# Patient Record
Sex: FEMALE | Race: WHITE
[De-identification: names, ages, dates, MRNs, and addresses within clinical notes are randomized per-mention and may not be internally consistent; named-entity substitution may affect disease eponyms.]

---

## 2017-04-17 ENCOUNTER — HOSPITAL ENCOUNTER (EMERGENCY)
Dept: HOSPITAL 10 - FTE | Age: 72
Discharge: HOME | End: 2017-04-17
Payer: MEDICARE

## 2017-04-17 VITALS
BODY MASS INDEX: 34.08 KG/M2 | WEIGHT: 185.19 LBS | BODY MASS INDEX: 34.08 KG/M2 | HEIGHT: 62 IN | HEIGHT: 62 IN | WEIGHT: 185.19 LBS

## 2017-04-17 VITALS
DIASTOLIC BLOOD PRESSURE: 77 MMHG | SYSTOLIC BLOOD PRESSURE: 156 MMHG | HEART RATE: 68 BPM | RESPIRATION RATE: 18 BRPM | TEMPERATURE: 98.2 F

## 2017-04-17 DIAGNOSIS — Z04.3: ICD-10-CM

## 2017-04-17 DIAGNOSIS — M25.561: Primary | ICD-10-CM

## 2017-04-17 DIAGNOSIS — I10: ICD-10-CM

## 2017-04-17 DIAGNOSIS — Z79.82: ICD-10-CM

## 2017-04-17 PROCEDURE — 96372 THER/PROPH/DIAG INJ SC/IM: CPT

## 2017-04-17 PROCEDURE — 29505 APPLICATION LONG LEG SPLINT: CPT

## 2017-04-17 PROCEDURE — 73562 X-RAY EXAM OF KNEE 3: CPT

## 2017-04-17 NOTE — RADRPT
PROCEDURE:   Right knee x-ray 

 

CLINICAL INDICATION:   Right knee pain.

 

TECHNIQUE:   AP, lateral and oblique views of the knee were obtained. 

 

COMPARISON:   None 

 

FINDINGS:

There are degenerative spurs off of the lateral femoral and tibial condyles and off of the articular
 surface of the right patella.  No effusion is present.  There is an enthesiophyte at the insertion 
site of the quadriceps tendon on the patella.

 

IMPRESSION:

 

1.  Osteoarthritis of the right knee without evidence of a joint space effusion.

2.  Enthesopathy of the quadriceps tendon.

 

 

RPTAT:AAJJ

_____________________________________________ 

Physician Josh           Date    Time 

Electronically viewed and signed by Physician Josh on 04/17/2017 16:39 

 

D:  04/17/2017 16:39  T:  04/17/2017 16:39

/

## 2017-04-17 NOTE — ERD
ER Documentation


Chief Complaint


Date/Time


DATE: 4/17/17 


TIME: 16:55


Chief Complaint


RT THIGH PAIN SINCE FRIDAY, SUDDEN ONSET, DENIES TRAUMA





HPI


This is a 71-year-old female that presents to the ER with right knee pain that 

occurred on Friday while she was doing Aure.  Patient states that she twisted 

her knee and heard a pop.  Patient has been trying to take ibuprofen for the 

pain however has not been working.  Patient denies any numbness or tingling of 

her leg.  She denies any leg swelling or redness.  Patient denies any chest 

pain or shortness of breath.





ROS


12 point review of systems was done, all negative except per HPI.





Medications


Home Meds


Active Scripts


Naproxen* (Naprosyn*) 500 Mg Tablet, 500 MG PO BID Y for PAIN AND/OR 

INFLAMMATION, #30 TAB


   Prov:MICAH TILLMAN         4/17/17


Tramadol HCl (Tramadol HCl) 50 Mg Tablet, 50 MG PO Q4 Y for PAIN, #20 TAB


   Prov:MICAH TILLMAN         4/17/17


Albuterol Sulfate* (Proair HFA*) 8.5 Gm Hfa.aer.ad, 2 PUFF INH Q4, #1 INHALER


   Prov:KATIE DELUCA PA-C         3/16/16


Prednisone* (Prednisone*) 50 Mg Tablet, 50 MG PO DAILY, #5 TAB


   Prov:KATIE DELUCA PA-C         3/16/16


Azithromycin* (Zithromax*) 250 Mg Tablet, 250 MG PO .ZPACK AS DIRECTED, #6 TAB


   TAKE 500 MG (2 TABS) THE FIRST DAY THEN 250 MG (1 TAB) DAYS 2-5


   Prov:KATIE DELUCA PA-C         3/16/16


Tramadol HCl (Tramadol HCl) 50 Mg Tablet, 50 MG PO Q4 Y for PAIN, #16 TAB


   Prov:GALLO RIVAS MD         2/4/16


Neomycin-Bacitrac Zinc-Polymyxin (Triple Antibiotic Ointment*) 28.35 Gm 

Oint..gm., 1 APPLIC TOP TID for 10 Days, EA


   Prov:GALLO RIVAS MD         2/4/16


Polyethylene Glycol* (Miralax*) 17 Gm Powd.pack, 17 GM PO DAILY, #7


   Prov:MAUREEN TAYLOR MD         12/21/15


Metronidazole* (Flagyl*) 500 Mg Tablet, 500 MG PO TID for 10 Days, TAB


   Prov:MAUREEN TAYLOR MD         12/21/15


Ciprofloxacin Hcl* (Ciprofloxacin Hcl*) 500 Mg Tablet, 500 MG PO BID for 10 Days

, TAB


   Prov:MAUREEN TAYLOR MD         12/21/15


Albuterol Sulfate* (Albuterol Sulfate* HFA) 8.5 Gm Hfa.aer.ad, 2 PUFF IH Q6, #1 

EA


   Prov:GALLO DOW PA-C         11/15/15


Reported Medications


Aspirin* (Aspirin* EC) 81 Mg Tablet.dr, 81 MG PO DAILY, TAB


   12/21/15


Amlodipine Besylate* (Amlodipine Besylate*) 10 Mg Tablet, 10 MG PO DAILY, #30 

TAB


   12/21/15


Atorvastatin Calcium (Atorvastatin Calcium) 10 Mg Tab, 10 MG PO HS, TAB


   10/29/14


Losartan-Hydrochlorothiazide (Losartan-HCTZ) 100-25 Mg Tab, 1 TAB PO AM, TAB


   10/29/14





Allergies


Allergies:  


Coded Allergies:  


     No Known Allergy (Unverified , 12/21/15)





PMhx/Soc


History of Surgery:  Yes ("right breast" )


Anesthesia Reaction:  No


Hx Neurological Disorder:  No


Hx Respiratory Disorders:  No


Hx Cardiac Disorders:  Yes (htn, hld)


Hx Psychiatric Problems:  No


Hx Miscellaneous Medical Probl:  No


Hx Alcohol Use:  No


Hx Substance Use:  No


Hx Tobacco Use:  No





Physical Exam


Vitals





Vital Signs








  Date Time  Temp Pulse Resp B/P Pulse Ox O2 Delivery O2 Flow Rate FiO2


 


4/17/17 13:35 97.9 62 18 176/79 97   








Physical Exam


GENERAL: The patient is well developed and appropriate for usual state of health

, in no apparent distress.


HEENT: Atraumatic.  


CHEST: Clear to auscultation bilaterally. There are no rales, wheezes or 

rhonchi. 


HEART: Regular rate and rhythm. No murmurs, clicks, rubs or gallops.


EXTREMITIES: Right knee: Patient has painful extension and flexion of the right 

knee there is no tenderness along the joint line.  Negative anterior drawer 

negative posterior drawer negative Lockman test.  Neurovascularly intact.  

Patient does not have any femur pain upon palpation.  She does not have any 

ankle pain.  She has normal range of motion of her ankle.


NEURO: Alert and oriented. 


SKIN: There is no apparent rash or petechia. The skin is warm and dry.


Results 24 hrs





 Current Medications








 Medications


  (Trade)  Dose


 Ordered  Sig/Diego


 Route


 PRN Reason  Start Time


 Stop Time Status Last Admin


Dose Admin


 


 Ketorolac


 Tromethamine


  (Toradol)  30 mg  ONCE  STAT


 IM


   4/17/17 15:39


 4/17/17 15:40 DC 4/17/17 16:15


 


 


 Acetaminophen/


 Hydrocodone Bitart


  (Norco (5/325))  1 tab  ONCE  ONCE


 PO


   4/17/17 16:00


 4/17/17 16:01 DC 4/17/17 16:15


 











Procedures/MDM


This is a 71-year-old female that presents to the ER with right knee pain.  

This occurred when she was doing Aure.  Patient may have a strain of her knee.

  At this time there is evidence of fracture dislocation.  She is 

neurovascularly intact.  Patient will be sent home with naproxen and with 

tramadol.  She was given a knee immobilizer.  She is neurovascularly intact 

before and after splint application.  Patient is to follow-up with her primary 

care doctor within 1-2 days or return to ER sooner if symptoms worsen.  My 

medical decision making shared with the patient she understands and agrees with 

plan.





Departure


Diagnosis:  


 Primary Impression:  


 Knee pain


Condition:  Stable


Patient Instructions:  Knee Pain, Uncertain Cause





Additional Instructions:  


Call your primary care doctor TOMORROW for an appointment during the next 1-2 

days.See the doctor sooner or return here if your condition worsens before your 

appointment time.











MICAH TILLMAN Apr 17, 2017 16:58

## 2018-10-29 ENCOUNTER — HOSPITAL ENCOUNTER (EMERGENCY)
Dept: HOSPITAL 91 - E/R | Age: 73
Discharge: HOME | End: 2018-10-29
Payer: MEDICARE

## 2018-10-29 ENCOUNTER — HOSPITAL ENCOUNTER (EMERGENCY)
Age: 73
Discharge: HOME | End: 2018-10-29

## 2018-10-29 DIAGNOSIS — R10.32: Primary | ICD-10-CM

## 2018-10-29 DIAGNOSIS — I10: ICD-10-CM

## 2018-10-29 DIAGNOSIS — Z79.82: ICD-10-CM

## 2018-10-29 LAB
ADD MAN DIFF?: NO
ADD UMIC: NO
ALANINE AMINOTRANSFERASE: 25 IU/L (ref 13–69)
ALBUMIN/GLOBULIN RATIO: 1.02
ALBUMIN: 3.5 G/DL (ref 3.3–4.9)
ALKALINE PHOSPHATASE: 131 IU/L (ref 42–121)
ANION GAP: 9 (ref 5–13)
ASPARTATE AMINO TRANSFERASE: 31 IU/L (ref 15–46)
BASOPHIL #: 0 10^3/UL (ref 0–0.1)
BASOPHILS %: 0.5 % (ref 0–2)
BILIRUBIN,DIRECT: 0 MG/DL (ref 0–0.2)
BILIRUBIN,TOTAL: 0.2 MG/DL (ref 0.2–1.3)
BLOOD UREA NITROGEN: 17 MG/DL (ref 7–20)
CALCIUM: 9.2 MG/DL (ref 8.4–10.2)
CARBON DIOXIDE: 28 MMOL/L (ref 21–31)
CHLORIDE: 104 MMOL/L (ref 97–110)
CREATININE: 0.79 MG/DL (ref 0.44–1)
EOSINOPHILS #: 0.1 10^3/UL (ref 0–0.5)
EOSINOPHILS %: 1.5 % (ref 0–7)
GLOBULIN: 3.4 G/DL (ref 1.3–3.2)
GLUCOSE: 104 MG/DL (ref 70–220)
HEMATOCRIT: 39.1 % (ref 37–47)
HEMOGLOBIN: 12.9 G/DL (ref 12–16)
IMMATURE GRANS #M: 0.02 10^3/UL (ref 0–0.03)
IMMATURE GRANS % (M): 0.3 % (ref 0–0.43)
LIPASE: 77 U/L (ref 23–300)
LYMPHOCYTES #: 1.5 10^3/UL (ref 0.8–2.9)
LYMPHOCYTES %: 23.4 % (ref 15–51)
MEAN CORPUSCULAR HEMOGLOBIN: 30.1 PG (ref 29–33)
MEAN CORPUSCULAR HGB CONC: 33 G/DL (ref 32–37)
MEAN CORPUSCULAR VOLUME: 91.4 FL (ref 82–101)
MEAN PLATELET VOLUME: 11.3 FL (ref 7.4–10.4)
MONOCYTE #: 0.5 10^3/UL (ref 0.3–0.9)
MONOCYTES %: 7.5 % (ref 0–11)
NEUTROPHIL #: 4.4 10^3/UL (ref 1.6–7.5)
NEUTROPHILS %: 66.8 % (ref 39–77)
NUCLEATED RED BLOOD CELLS #: 0 10^3/UL (ref 0–0)
NUCLEATED RED BLOOD CELLS%: 0 /100WBC (ref 0–0)
PLATELET COUNT: 193 10^3/UL (ref 140–415)
POTASSIUM: 4 MMOL/L (ref 3.5–5.1)
RED BLOOD COUNT: 4.28 10^6/UL (ref 4.2–5.4)
RED CELL DISTRIBUTION WIDTH: 12.5 % (ref 11.5–14.5)
SODIUM: 141 MMOL/L (ref 135–144)
TOTAL PROTEIN: 6.9 G/DL (ref 6.1–8.1)
UR ASCORBIC ACID: NEGATIVE MG/DL
UR BILIRUBIN (DIP): NEGATIVE MG/DL
UR BLOOD (DIP): NEGATIVE MG/DL
UR CLARITY: CLEAR
UR COLOR: (no result)
UR GLUCOSE (DIP): NEGATIVE MG/DL
UR KETONES (DIP): NEGATIVE MG/DL
UR LEUKOCYTE ESTERASE (DIP): NEGATIVE LEU/UL
UR NITRITE (DIP): NEGATIVE MG/DL
UR PH (DIP): 7 (ref 5–9)
UR SPECIFIC GRAVITY (DIP): 1.01 (ref 1–1.03)
UR TOTAL PROTEIN (DIP): NEGATIVE MG/DL
UR UROBILINOGEN (DIP): NEGATIVE MG/DL
WHITE BLOOD COUNT: 6.5 10^3/UL (ref 4.8–10.8)

## 2018-10-29 PROCEDURE — 74176 CT ABD & PELVIS W/O CONTRAST: CPT

## 2018-10-29 PROCEDURE — 96374 THER/PROPH/DIAG INJ IV PUSH: CPT

## 2018-10-29 PROCEDURE — 85025 COMPLETE CBC W/AUTO DIFF WBC: CPT

## 2018-10-29 PROCEDURE — 99285 EMERGENCY DEPT VISIT HI MDM: CPT

## 2018-10-29 PROCEDURE — 81003 URINALYSIS AUTO W/O SCOPE: CPT

## 2018-10-29 PROCEDURE — 36415 COLL VENOUS BLD VENIPUNCTURE: CPT

## 2018-10-29 PROCEDURE — 83690 ASSAY OF LIPASE: CPT

## 2018-10-29 PROCEDURE — 80053 COMPREHEN METABOLIC PANEL: CPT

## 2018-10-29 RX ADMIN — KETOROLAC TROMETHAMINE 1 MG: 30 INJECTION, SOLUTION INTRAMUSCULAR at 09:08

## 2019-01-06 ENCOUNTER — HOSPITAL ENCOUNTER (EMERGENCY)
Dept: HOSPITAL 91 - FTE | Age: 74
Discharge: HOME | End: 2019-01-06
Payer: MEDICARE

## 2019-01-06 ENCOUNTER — HOSPITAL ENCOUNTER (EMERGENCY)
Dept: HOSPITAL 10 - FTE | Age: 74
Discharge: HOME | End: 2019-01-06
Payer: MEDICARE

## 2019-01-06 VITALS
WEIGHT: 178.79 LBS | HEIGHT: 66 IN | BODY MASS INDEX: 28.73 KG/M2 | WEIGHT: 178.79 LBS | BODY MASS INDEX: 28.73 KG/M2 | HEIGHT: 66 IN

## 2019-01-06 VITALS — SYSTOLIC BLOOD PRESSURE: 140 MMHG | DIASTOLIC BLOOD PRESSURE: 67 MMHG | RESPIRATION RATE: 16 BRPM | HEART RATE: 86 BPM

## 2019-01-06 DIAGNOSIS — I10: ICD-10-CM

## 2019-01-06 DIAGNOSIS — J06.9: Primary | ICD-10-CM

## 2019-01-06 DIAGNOSIS — Z79.82: ICD-10-CM

## 2019-01-06 PROCEDURE — 96372 THER/PROPH/DIAG INJ SC/IM: CPT

## 2019-01-06 PROCEDURE — 99284 EMERGENCY DEPT VISIT MOD MDM: CPT

## 2019-01-06 PROCEDURE — 71045 X-RAY EXAM CHEST 1 VIEW: CPT

## 2019-01-06 RX ADMIN — KETOROLAC TROMETHAMINE 1 MG: 30 INJECTION, SOLUTION INTRAMUSCULAR at 19:12

## 2019-01-06 RX ADMIN — ACETAMINOPHEN 1 MG: 325 TABLET, FILM COATED ORAL at 19:12

## 2019-01-06 NOTE — ERD
ER Documentation


Chief Complaint


Chief Complaint





Complains of cough, colds and flu symptoms x 3 days





HPI


73-year-old female presents with cough, fever and body aches for last 3 days.  


She denies any chest pain, vomiting, abdominal pain.





ROS


All systems reviewed and are negative except as per history of present illness.





Medications


Home Meds


Active Scripts


Oseltamivir Phosphate* (Tamiflu*) 75 Mg Capsule, 75 MG PO BID for 5 Days, CAP


   Prov:GALLO RIVAS MD         1/6/19


Dextromethorphan Hb-Promethazine Hcl* (Promethazine DM* Syrup) 473 Ml Syrup, 5 


ML PO Q6 PRN for COUGH for 5 Days, ML


   Prov:GALLO RIVAS MD         1/6/19


Acetaminophen* (Tylophen*) 500 Mg Capsule, 1 CAP PO Q6H PRN for PAIN AND OR 


ELEVATED TEMP, #15 CAP


   Prov:GALLO RIVAS MD         1/6/19


Ibuprofen* (Motrin*) 600 Mg Tab, 600 MG PO Q6H PRN for PAIN AND OR ELEVATED 


TEMP, #30 TAB


   Prov:AL QUINTANA MD         10/29/18


Sennosides* (Senna Lax*) 8.6 Mg Tablet, 1 TAB PO DAILY, #30 TAB


   Prov:AL QUINTANA MD         10/29/18


Docusate Sodium* (Colace*) 100 Mg Capsule, 100 MG PO TID, #30 CAP


   Prov:AL QUINTANA MD         10/29/18


Naproxen* (Naprosyn*) 500 Mg Tablet, 500 MG PO BID PRN for PAIN AND/OR 


INFLAMMATION, #30 TAB


   Prov:MICAH TILLMAN         4/17/17


Albuterol Sulfate* (Albuterol Sulfate* HFA) 8.5 Gm Hfa.aer.ad, 2 PUFF IH Q6, #1 


EA


   Prov:GALLO DOW PA-C         11/15/15


Reported Medications


Clonidine Hcl* (Clonidine Hcl*) 0.2 Mg Tablet, 0.2 MG PO q12h prn PRN for HTN, 


TAB


   10/29/18


Carvedilol* (Coreg*) 25 Mg Tablet, 25 MG PO BID, #60 TAB


   10/29/18


Aspirin* (Aspirin* EC) 81 Mg Tablet.dr, 81 MG PO DAILY, TAB


   12/21/15


Amlodipine Besylate* (Amlodipine Besylate*) 10 Mg Tablet, 10 MG PO DAILY, #30 


TAB


   12/21/15


Atorvastatin Calcium (Atorvastatin Calcium) 10 Mg Tab, 10 MG PO HS, TAB


   10/29/14





Allergies


Allergies:  


Coded Allergies:  


     No Known Allergy (Unverified , 12/21/15)





PMhx/Soc


History of Surgery:  Yes ("right breast" )


Anesthesia Reaction:  No


Hx Neurological Disorder:  No


Hx Respiratory Disorders:  No


Hx Cardiac Disorders:  Yes (htn, hld)


Hx Psychiatric Problems:  No


Hx Miscellaneous Medical Probl:  Yes (pre-dm)


Hx Alcohol Use:  No


Hx Substance Use:  No


Hx Tobacco Use:  No


Smoking Status:  Never smoker





FmHx


Family History:  No diabetes, No coronary disease, No other





Physical Exam


Vitals





Vital Signs


  Date      Temp   Pulse  Resp  B/P (MAP)   Pulse Ox  O2         O2 Flow    FiO2


Time                                                  Delivery   Rate


    1/6/19  102.5


     19:12


    1/6/19  102.5     86    20      171/83        97


     16:28                           (112)





Physical Exam


Const:   No acute distress


Head:   Atraumatic 


Eyes:    Normal Conjunctiva


ENT:    Normal External Ears, Nose and Mouth.  Was in oropharynx normal.


Neck:               Full range of motion. No meningismus.


Resp:   Clear to auscultation bilaterally dry coarse cough without rales, 


wheezing or retractions.


Cardio:   Regular rate and rhythm, no murmurs


Abd:    Soft, non tender, non distended. Normal bowel sounds


Skin:   No petechiae or rashes


Back:   No midline or flank tenderness


Ext:    No cyanosis, or edema


Neur:   Awake and alert


Psych:    Normal Mood and Affect


Results 24 hrs





Current Medications


 Medications
   Dose
          Sig/Diego
       Start Time
   Status  Last


 (Trade)       Ordered        Route
 PRN     Stop Time              Admin
Dose


                              Reason                                Admin


                650 mg         ONCE  ONCE
    1/6/19        DC            1/6/19


Acetaminophen                 PO
            19:00
 1/6/19                19:12




  (Tylenol                                  19:01


Tab)


 Ketorolac
     30 mg          ONCE  STAT
    1/6/19        DC            1/6/19


Tromethamine
                 IM
            18:53
 1/6/19                19:12



 (Toradol)                                   18:55








Procedures/MDM


Chest X-ray 1V Interpreted by me:


Soft Tissue:                     No acute abnormalities


Bones:                     No acute abnormalities


Mediastinum/Cardiac Silhouette/Lungs:   No acute abnormalities


 impression-normal 1 view chest x-ray





She presents with 3-day history of cough, fever and body aches, likely influenza


without evidence of hypoxemia, respiratory distress, pneumonia, cardiac chest 


pain.  She will be treated empirically with Tamiflu, promethazine, fever 


control, primary care follow-up, return precautions.  The patient was stable 


with no new complaints during the ER course. Clinically, there is no current 


evidence to suggest meningitis, sepsis, acute abdomen, pneumonia, stroke,  acute


coronary syndrome, pulmonary embolism, aortic dissection or any other emergent 


condition appearing to require further evaluation or hospitalization.  Patient 


counseled regarding my diagnostic impression and care plan. Prior to discharge 


all questions answered. Pt agrees with treatment plan and understands strict 


return precautions. Pt is instructed to follow up with primary care provider 


within 24-48 hours. Precautionary instructions provided including instructions 


to return to the ER if not improving or for any worsening or changing symptoms 


or concerns.





Departure


Diagnosis:  


   Primary Impression:  


   Upper respiratory infection


   URI type:  unspecified URI  Qualified Codes:  J06.9 - Acute upper respiratory


   infection, unspecified


   Additional Impression:  


   Influenza-like symptoms


Condition:  Stable


Patient Instructions:  Fever Control (Adult), Influenza (Adult)


Referrals:  


DOCTOR,NOT ON STAFF (PCP)





Additional Instructions:  


x ray normal.   Cheque otro vez con thompson doctor primario en el proximo rosas or 


regresa para mas o nueva simptomas.  Probablamente un virus o flu que dura 2-4 


rosas. cheque otro vez en el proximo hank para mas simptomas- vomito, dolor, 


love,  problemas con respirando, o con thompson doctor primario.











GALLO RIVAS MD              Jan 6, 2019 19:53